# Patient Record
Sex: FEMALE | Race: WHITE | NOT HISPANIC OR LATINO
[De-identification: names, ages, dates, MRNs, and addresses within clinical notes are randomized per-mention and may not be internally consistent; named-entity substitution may affect disease eponyms.]

---

## 2017-08-30 ENCOUNTER — APPOINTMENT (OUTPATIENT)
Dept: NUCLEAR MEDICINE | Facility: HOSPITAL | Age: 27
End: 2017-08-30
Payer: MEDICAID

## 2017-08-30 ENCOUNTER — OUTPATIENT (OUTPATIENT)
Dept: OUTPATIENT SERVICES | Facility: HOSPITAL | Age: 27
LOS: 1 days | End: 2017-08-30

## 2017-08-30 DIAGNOSIS — R14.3 FLATULENCE: ICD-10-CM

## 2017-08-30 PROCEDURE — 78264 GASTRIC EMPTYING IMG STUDY: CPT | Mod: 26

## 2017-12-05 ENCOUNTER — APPOINTMENT (OUTPATIENT)
Dept: OTOLARYNGOLOGY | Facility: CLINIC | Age: 27
End: 2017-12-05
Payer: COMMERCIAL

## 2017-12-05 VITALS — WEIGHT: 108 LBS | HEIGHT: 63 IN | BODY MASS INDEX: 19.14 KG/M2

## 2017-12-05 DIAGNOSIS — J34.2 DEVIATED NASAL SEPTUM: ICD-10-CM

## 2017-12-05 DIAGNOSIS — M26.609 UNSPECIFIED TEMPOROMANDIBULAR JOINT DISORDER: ICD-10-CM

## 2017-12-05 DIAGNOSIS — Z86.59 PERSONAL HISTORY OF OTHER MENTAL AND BEHAVIORAL DISORDERS: ICD-10-CM

## 2017-12-05 DIAGNOSIS — Z86.79 PERSONAL HISTORY OF OTHER DISEASES OF THE CIRCULATORY SYSTEM: ICD-10-CM

## 2017-12-05 DIAGNOSIS — Z87.09 PERSONAL HISTORY OF OTHER DISEASES OF THE RESPIRATORY SYSTEM: ICD-10-CM

## 2017-12-05 PROCEDURE — 31231 NASAL ENDOSCOPY DX: CPT

## 2017-12-05 PROCEDURE — 99203 OFFICE O/P NEW LOW 30 MIN: CPT | Mod: 25

## 2017-12-05 RX ORDER — LISINOPRIL 5 MG/1
5 TABLET ORAL
Refills: 0 | Status: ACTIVE | COMMUNITY

## 2017-12-05 RX ORDER — FLUTICASONE PROPIONATE 50 UG/1
50 SPRAY, METERED NASAL
Refills: 0 | Status: ACTIVE | COMMUNITY

## 2017-12-13 ENCOUNTER — APPOINTMENT (OUTPATIENT)
Dept: OTOLARYNGOLOGY | Facility: CLINIC | Age: 27
End: 2017-12-13

## 2018-01-03 ENCOUNTER — APPOINTMENT (OUTPATIENT)
Dept: INTERNAL MEDICINE | Facility: CLINIC | Age: 28
End: 2018-01-03

## 2019-12-09 ENCOUNTER — APPOINTMENT (OUTPATIENT)
Dept: ORTHOPEDIC SURGERY | Facility: CLINIC | Age: 29
End: 2019-12-09
Payer: COMMERCIAL

## 2019-12-09 VITALS
SYSTOLIC BLOOD PRESSURE: 117 MMHG | WEIGHT: 121 LBS | HEART RATE: 80 BPM | BODY MASS INDEX: 21.44 KG/M2 | HEIGHT: 63 IN | DIASTOLIC BLOOD PRESSURE: 78 MMHG

## 2019-12-09 PROCEDURE — 99203 OFFICE O/P NEW LOW 30 MIN: CPT

## 2019-12-09 PROCEDURE — 73600 X-RAY EXAM OF ANKLE: CPT | Mod: 50

## 2019-12-09 PROCEDURE — 73630 X-RAY EXAM OF FOOT: CPT | Mod: 50

## 2019-12-11 ENCOUNTER — APPOINTMENT (OUTPATIENT)
Dept: ORTHOPEDIC SURGERY | Facility: CLINIC | Age: 29
End: 2019-12-11
Payer: COMMERCIAL

## 2019-12-11 VITALS
HEART RATE: 76 BPM | DIASTOLIC BLOOD PRESSURE: 77 MMHG | BODY MASS INDEX: 21.44 KG/M2 | WEIGHT: 121 LBS | SYSTOLIC BLOOD PRESSURE: 121 MMHG | HEIGHT: 63 IN

## 2019-12-11 DIAGNOSIS — M25.551 PAIN IN RIGHT HIP: ICD-10-CM

## 2019-12-11 DIAGNOSIS — G89.29 PAIN IN RIGHT HIP: ICD-10-CM

## 2019-12-11 DIAGNOSIS — M54.16 RADICULOPATHY, LUMBAR REGION: ICD-10-CM

## 2019-12-11 DIAGNOSIS — M25.552 PAIN IN RIGHT HIP: ICD-10-CM

## 2019-12-11 PROCEDURE — 99204 OFFICE O/P NEW MOD 45 MIN: CPT | Mod: 25

## 2019-12-11 PROCEDURE — 73562 X-RAY EXAM OF KNEE 3: CPT | Mod: LT

## 2019-12-11 PROCEDURE — 73502 X-RAY EXAM HIP UNI 2-3 VIEWS: CPT | Mod: RT

## 2020-03-10 ENCOUNTER — APPOINTMENT (OUTPATIENT)
Dept: ORTHOPEDIC SURGERY | Facility: CLINIC | Age: 30
End: 2020-03-10

## 2021-12-10 ENCOUNTER — NON-APPOINTMENT (OUTPATIENT)
Age: 31
End: 2021-12-10

## 2021-12-30 ENCOUNTER — APPOINTMENT (OUTPATIENT)
Dept: ORTHOPEDIC SURGERY | Facility: CLINIC | Age: 31
End: 2021-12-30
Payer: COMMERCIAL

## 2021-12-30 ENCOUNTER — NON-APPOINTMENT (OUTPATIENT)
Age: 31
End: 2021-12-30

## 2021-12-30 ENCOUNTER — APPOINTMENT (OUTPATIENT)
Dept: ORTHOPEDIC SURGERY | Facility: CLINIC | Age: 31
End: 2021-12-30

## 2021-12-30 VITALS — HEIGHT: 63 IN | BODY MASS INDEX: 21.44 KG/M2 | WEIGHT: 121 LBS

## 2021-12-30 PROCEDURE — 99213 OFFICE O/P EST LOW 20 MIN: CPT

## 2021-12-31 NOTE — HISTORY OF PRESENT ILLNESS
[Sneakers] : hilton [FreeTextEntry1] : Ms. WESLEY ESTEVES  is a 31 year old female who presents to the office for a follow-up visit accompanied by her mother.   She is here for medical necessity letter for her bilateral AFO's which she is currently wearing and provide subjective functional benefit. PMH (by patient report) includes autism spectrum disorder and history of hypotonia with gait / balance disturbance.

## 2021-12-31 NOTE — PHYSICAL EXAM
[Normal] : Alert and in no acute distress [de-identified] : Extremity:+PPAV (supple) B feet, able to perform B SLHR testing / residual weakness with dynamic testing. No focal tenderness B ankles, peroneals, syndesmosis, Achilles, hindfoot ST, midfoot LF and PTT insertional, and forefoot. Stable Drawer testing B ankles, 5 / 5 evertor strength B ankles, calves soft and nontender, sensorimotor unchanged, skin intact B LE.  Shuffling gait.  [de-identified] : ALIZE, NAD

## 2021-12-31 NOTE — DISCUSSION/SUMMARY
[de-identified] : Options reviewed with patient and parent. NB shoe wear, low impact activity, HEP, B AFO's.  Physiatry assessment and management, gait training and balance. Return to office in 3 - 4 months / PRN, all questions answered.

## 2022-10-20 ENCOUNTER — EMERGENCY (EMERGENCY)
Facility: HOSPITAL | Age: 32
LOS: 1 days | Discharge: HOME | End: 2022-10-20
Attending: STUDENT IN AN ORGANIZED HEALTH CARE EDUCATION/TRAINING PROGRAM | Admitting: STUDENT IN AN ORGANIZED HEALTH CARE EDUCATION/TRAINING PROGRAM

## 2022-10-20 VITALS
DIASTOLIC BLOOD PRESSURE: 91 MMHG | OXYGEN SATURATION: 99 % | HEIGHT: 63 IN | RESPIRATION RATE: 18 BRPM | SYSTOLIC BLOOD PRESSURE: 117 MMHG | HEART RATE: 89 BPM | TEMPERATURE: 98 F

## 2022-10-20 DIAGNOSIS — F84.0 AUTISTIC DISORDER: ICD-10-CM

## 2022-10-20 DIAGNOSIS — F43.29 ADJUSTMENT DISORDER WITH OTHER SYMPTOMS: ICD-10-CM

## 2022-10-20 PROCEDURE — 99284 EMERGENCY DEPT VISIT MOD MDM: CPT

## 2022-10-20 PROCEDURE — 90792 PSYCH DIAG EVAL W/MED SRVCS: CPT

## 2022-10-20 NOTE — ED BEHAVIORAL HEALTH ASSESSMENT NOTE - RISK ASSESSMENT
Risk factors include: single, anxiety, insomnia, acute psychosocial stressors, poor reactivity to stressors, difficulty expressing emotions, low frustration tolerance, absence of outpatient follow-up.    Chronic risk factors for suicide include: diagnosis of autism spectrum d/o.    Protective factors include: Young, healthy, denies current SI/I/P, no history of suicide attempts, no history of NSSIB, identifies reasons for living, fear of death/dying due to pain/suffering, future oriented, help-seeking, supportive family/friends, access to services. Low Acute Suicide Risk
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
## 2022-10-20 NOTE — ED PROVIDER NOTE - PATIENT PORTAL LINK FT
You can access the FollowMyHealth Patient Portal offered by [facility] by registering at the following website: [email or web address]. By joining [email or web address]’s FollowMyHealth portal, you will also be able to view your health information using other applications (apps) compatible with our system.

## 2022-10-20 NOTE — ED BEHAVIORAL HEALTH ASSESSMENT NOTE - NSTXRELFACTOR_PSY_ALL_CORE
PRE-OP DATA and Check List - GI:  Procedure: Colonoscopy (31976) with   Diagnosis: colon cancer screening  Tentative Date: Routine (next available or patient preference)  Tentative Time: To be determined  Duration of Procedure: 45 min  Anesthesia: MAC     No preop needed    No meds to adjust.        Non-compliant or not receiving treatment

## 2022-10-20 NOTE — ED BEHAVIORAL HEALTH ASSESSMENT NOTE - REFERRAL / APPOINTMENT DETAILS
Patient provided with list of OPWDD services that can provide list of outpatient autism services, counseled to follow up with . Also provided patient and family with [facility] crisis clinic. Patient provided with list of [facility] services that can provide list of outpatient autism services, counseled to follow up with . Also provided patient and family with [facility] crisis clinic. as well as [facility]

## 2022-10-20 NOTE — ED BEHAVIORAL HEALTH NOTE - BEHAVIORAL HEALTH NOTE
Writer provided list of OPWDD services that can provide list of outpatient Autism Services:   [facility]:  [phone]   [facility] [phone]  [facility]: [phone]  [facility]: [phone]  [facility]
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
## 2022-10-20 NOTE — ED ADULT TRIAGE NOTE - CHIEF COMPLAINT QUOTE
Patient brought to ER by EMS from home after she admitted to having suicidal ideations due to frustration. Mom got upset with the SW that was trying to set some kind of schooling for her ([facility] and  instead of talking to her, she is talking to her mom.)
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
## 2022-10-20 NOTE — ED BEHAVIORAL HEALTH ASSESSMENT NOTE - THERAPIST
Therapist activated 911 for SI during intake session. Intake SW activated 911 for passive SI during intake session.

## 2022-10-20 NOTE — ED BEHAVIORAL HEALTH ASSESSMENT NOTE - SAFETY PLAN ADDT'L DETAILS
PROVIDER:[TOKEN:[[id number]],FOLLOWUP:[1-3 Days]] Safety plan discussed with.../Education provided regarding environmental safety / lethal means restriction/Provision of National Suicide Prevention Lifeline 0-655-306-LZIR (3322)
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
## 2022-10-20 NOTE — ED BEHAVIORAL HEALTH ASSESSMENT NOTE - DETAILS
Patient not established in care. see HPI Extensive safety planning performed with patient and family. In addition to extensive discussion of safe places patient can go to, distraction techniques, coping skills, motivational interviewing and who patient can call in the event of crisis including various hotlines. Patient and family agreeing verbally to return patient to ER or call 911 if symptoms worsen or patient has urges to harm self or others. Patient not established in care., mother aware of plan
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
## 2022-10-20 NOTE — ED PROVIDER NOTE - OBJECTIVE STATEMENT
33 yo F hx autism, HTN, presenting for evaluation after phone call with mother. Pt reports feeling anxious/depressed (tearful during encounter). Endorses SI yesterday with plan to hurt herself with scissors. Denies SI/HI right now. No hx suicidal attempt. Denies VH/AH. No drug/alcohol use.    Pt states she previously followed with a therapist but has not seen one since July

## 2022-10-20 NOTE — ED ADULT NURSE NOTE - OBJECTIVE STATEMENT
Pt called a psych hotline today and made suicidal statements- passively. Hotline called ems on her for SI. Pt is autistic, deneis si,hi,ah,vh,etoh, drug use on arrival to .

## 2022-10-20 NOTE — ED BEHAVIORAL HEALTH ASSESSMENT NOTE - NSSUICRSKFACTOR_PSY_ALL_CORE
Presenting Symptoms Current and Past Psychiatric Diagnoses/Presenting Symptoms/Treatment Related Factors/Activating Events/Stressors

## 2022-10-20 NOTE — ED BEHAVIORAL HEALTH ASSESSMENT NOTE - OTHER PAST PSYCHIATRIC HISTORY (INCLUDE DETAILS REGARDING ONSET, COURSE OF ILLNESS, INPATIENT/OUTPATIENT TREATMENT)
History of Autism Spectrum Disorder. Patient had IEP, PT/OT/SLP in high school. Currently has OPWDD. No history of other psychiatric disorders, no psychiatrist. Had a therapist who had to go on medical leave in August 2022, is still looking for a new therapist.

## 2022-10-21 NOTE — ED BEHAVIORAL HEALTH NOTE - BEHAVIORAL HEALTH NOTE
high risk f/u:    Writer  received a call from patient's mother (Isha Berry, [phone]) who states that she has reached out to the pros program and has not heard back. Worker provided the pros contact number and also recommended to leave a message for call back. Worker also encouraged pt’s mother to follow up with patient’s case coordinator through opwdd. Mother states “this is not helpful”. Worker provided information to [facility]. Mother states that the waiting lists is not helpful. Pt’s mother was also provided [facility] – mother declined because it is too far. Mother declined zocdoc and psychology today. Mother denies for patient to follow up with [facility] because they do not have PHD and just social workers there. Mother refused [facility] clinic. Mother accepted developmental disabilities in [place] and states that she will try this. Patient is high functioning and mother states she is home. Mother states that she has been having difficulty linking the patient because of the care that pt receives and what is available to her. Worker encouraged pt’s mother to follow through with her opwdd care coordinator or supervisor to help with linkage and additional resources.

## 2022-11-02 ENCOUNTER — APPOINTMENT (OUTPATIENT)
Dept: ORTHOPEDIC SURGERY | Facility: CLINIC | Age: 32
End: 2022-11-02

## 2022-11-10 ENCOUNTER — APPOINTMENT (OUTPATIENT)
Dept: ORTHOPEDIC SURGERY | Facility: CLINIC | Age: 32
End: 2022-11-10

## 2022-11-10 VITALS — HEIGHT: 63 IN | WEIGHT: 121 LBS | BODY MASS INDEX: 21.44 KG/M2

## 2022-11-10 DIAGNOSIS — M62.89 OTHER SPECIFIED DISORDERS OF MUSCLE: ICD-10-CM

## 2022-11-10 DIAGNOSIS — M21.41 FLAT FOOT [PES PLANUS] (ACQUIRED), RIGHT FOOT: ICD-10-CM

## 2022-11-10 DIAGNOSIS — M21.42 FLAT FOOT [PES PLANUS] (ACQUIRED), RIGHT FOOT: ICD-10-CM

## 2022-11-10 DIAGNOSIS — R26.9 UNSPECIFIED ABNORMALITIES OF GAIT AND MOBILITY: ICD-10-CM

## 2022-11-10 PROCEDURE — 99213 OFFICE O/P EST LOW 20 MIN: CPT

## 2022-11-12 PROBLEM — R26.9 GAIT ABNORMALITY: Status: ACTIVE | Noted: 2019-12-16

## 2022-11-12 PROBLEM — M62.89 HYPOTONIA: Status: ACTIVE | Noted: 2022-11-12

## 2022-11-12 PROBLEM — M21.41 ACQUIRED PES PLANUS OF BOTH FEET: Status: ACTIVE | Noted: 2019-12-09

## 2023-04-12 ENCOUNTER — NON-APPOINTMENT (OUTPATIENT)
Age: 33
End: 2023-04-12

## 2023-04-30 NOTE — ED ADULT NURSE NOTE - NSFALLRSKHARMRISK_ED_ALL_ED
Physical Therapy Visit    Visit Type: Daily Treatment Note  Visit: 8  Referring Provider: Sarah Guevara MD  Medical Diagnosis (from order): Diagnosis Information    Diagnosis  724.2 (ICD-9-CM) - M54.50 (ICD-10-CM) - Low back pain, unspecified back pain laterality, unspecified chronicity, unspecified whether sciatica present  719.45 (ICD-9-CM) - M25.559 (ICD-10-CM) - Hip pain         SUBJECTIVE                                                                                                               Patient reports of symptoms throughout her body, predominately along her hips, shoulder and neck.  While her right arm no longer feels numb and her right shoulder feels somewhat better overall, she still remains limited by the ongoing nature of her symptoms.  No particular position or activity is noted to aggravate her symptoms in general.    Functional Change: See subjective report.      OBJECTIVE                                                                                                                                       Treatment    Dry Needling:  - Consent signed: yes  - Dry needling used to/for: pain relief and reduced myofascial dysfunction/restriction  - Dry Needling Informed Consent Signed: Yes    Education about indications, contraindications and potential side effects completed with patient.    Screen Completed   Precautions: Local skin lesions, Lyme disease, Local lymphedema, Severe hyperalgesia/allodynia, Metal allergies: nickel and chromium, Abnormal bleeding tendency, Immunodeficiency and/or compromised immune system, Second or third trimester of pregnancy, Vascular Disease, History of spontaneous pneumothorax  Contraindications: Local or systemic infections including the flu, Over implants, Active cancer, Area of lymphatic compromise, Area of lumpectomy/mastectomy, First trimester of pregnancy    The following potential risks and adverse effects were reviewed with the patient:  bleeding,  broken/retained needle, bruising, fainting/dizziness, infection, internal organ injury, nerve injury, pain during and after treatment, pneumothorax resulting potentially in death, sweating, vasovagal response including cardiac arrest    Patient has consented to proceed with the intervention.    Dry Needling:   Needles inserted 6  Needles removed 6  Locations and Needle Size Right upper trapezius, levator scapulae and rhomboids (30 mm)  Treatment duration 15 minutes  Patient response Good, no adverse reaction noted      Results: no change in symptoms immediately following  Reaction: no adverse reaction to treatment      Therapeutic Exercise  *Kneeling Hip Flexor Stretch (B): x  *Standing Hip Flexor Stretch (B): x  *Lat Stretch: x  *Stair Hamstring Stretch (B): x  *Seated Hamstring Stretch (B): x  *Squats: x, UE support  *Band Pulldowns: x, red band  *Monster Walks: x, green band  *Upper Cervical Snags: Reviewed  *Cervical Retractions: Reviewed  *Supine Thoracic Ext with Shoulder Abd: x  *Scap Squeezes: x  *Band Rows: x  *Thoracic Ext in Sitting: Reviewed  *Levator Scapulae Stretch: Reviewed    Activities of Daily Living/Self Care  PT educated the patient at length regarding her current condition and realistic expectations.      Skilled input: as detailed above    Writer verbally educated and received verbal consent for hand placement, positioning of patient, and techniques to be performed today from patient for clothing adjustments for techniques, therapist position for techniques and modality application as described above and how they are pertinent to the patient's plan of care.        ASSESSMENT                                                                                                            Based on the ongoing limiting nature of the patient's symptoms, not only would she benefit from continued PT interventions but further examination by a medical provider as well.  While a true musculoskeletal condition  is present, some signs and symptoms remain consistent with a more systemic condition.    Education:   - Results of above outlined education: Verbalizes understanding and Demonstrates understanding    PLAN                                                                                                                           Suggestions for next session as indicated: Progress per plan of care       Therapy procedure time and total treatment time can be found documented on the Time Entry flowsheet     no

## 2023-06-20 ENCOUNTER — APPOINTMENT (OUTPATIENT)
Dept: ORTHOPEDIC SURGERY | Facility: CLINIC | Age: 33
End: 2023-06-20

## 2023-10-01 PROBLEM — M54.16 LUMBAR RADICULAR PAIN: Status: ACTIVE | Noted: 2019-12-11

## 2023-11-30 ENCOUNTER — APPOINTMENT (OUTPATIENT)
Age: 33
End: 2023-11-30
Payer: COMMERCIAL

## 2023-11-30 PROCEDURE — D9310: CPT

## 2023-12-07 ENCOUNTER — APPOINTMENT (OUTPATIENT)
Age: 33
End: 2023-12-07

## 2024-12-30 ENCOUNTER — APPOINTMENT (OUTPATIENT)
Dept: NEPHROLOGY | Facility: CLINIC | Age: 34
End: 2024-12-30

## 2025-07-09 ENCOUNTER — APPOINTMENT (OUTPATIENT)
Dept: PHYSICAL MEDICINE AND REHAB | Facility: CLINIC | Age: 35
End: 2025-07-09
Payer: COMMERCIAL

## 2025-07-09 PROBLEM — R29.898 HYPOTONIA: Status: ACTIVE | Noted: 2022-11-12

## 2025-07-09 PROBLEM — R26.89 ABNORMALITY OF GAIT DUE TO IMPAIRMENT OF BALANCE: Status: ACTIVE | Noted: 2025-07-09

## 2025-07-09 PROCEDURE — 99204 OFFICE O/P NEW MOD 45 MIN: CPT

## 2025-07-28 ENCOUNTER — APPOINTMENT (OUTPATIENT)
Dept: ORTHOPEDIC SURGERY | Facility: CLINIC | Age: 35
End: 2025-07-28
Payer: COMMERCIAL

## 2025-07-28 DIAGNOSIS — M21.41 FLAT FOOT [PES PLANUS] (ACQUIRED), RIGHT FOOT: ICD-10-CM

## 2025-07-28 DIAGNOSIS — R26.9 UNSPECIFIED ABNORMALITIES OF GAIT AND MOBILITY: ICD-10-CM

## 2025-07-28 DIAGNOSIS — M21.42 FLAT FOOT [PES PLANUS] (ACQUIRED), RIGHT FOOT: ICD-10-CM

## 2025-07-28 PROCEDURE — 99214 OFFICE O/P EST MOD 30 MIN: CPT
